# Patient Record
Sex: MALE | Race: WHITE | NOT HISPANIC OR LATINO | URBAN - METROPOLITAN AREA
[De-identification: names, ages, dates, MRNs, and addresses within clinical notes are randomized per-mention and may not be internally consistent; named-entity substitution may affect disease eponyms.]

---

## 2021-05-10 ENCOUNTER — OFFICE VISIT (OUTPATIENT)
Dept: URBAN - METROPOLITAN AREA CLINIC 68 | Facility: CLINIC | Age: 61
End: 2021-05-10

## 2021-05-18 ENCOUNTER — OFFICE VISIT (OUTPATIENT)
Dept: URBAN - METROPOLITAN AREA CLINIC 68 | Facility: CLINIC | Age: 61
End: 2021-05-18

## 2021-05-27 ENCOUNTER — OFFICE VISIT (OUTPATIENT)
Dept: URBAN - METROPOLITAN AREA SURGERY CENTER 12 | Facility: SURGERY CENTER | Age: 61
End: 2021-05-27

## 2022-06-04 ENCOUNTER — TELEPHONE ENCOUNTER (OUTPATIENT)
Dept: URBAN - METROPOLITAN AREA CLINIC 68 | Facility: CLINIC | Age: 62
End: 2022-06-04

## 2022-06-04 RX ORDER — GAUZE BANDAGE 4" X 4"
FISH OIL CONCENTRATE( 1000MG ORAL  DAILY ) INACTIVE -HX ENTRY BANDAGE TOPICAL DAILY
OUTPATIENT
Start: 2021-05-10

## 2022-06-04 RX ORDER — POLYETHYLENE GLYCOL 3350, SODIUM SULFATE, SODIUM CHLORIDE, POTASSIUM CHLORIDE, ASCORBIC ACID, SODIUM ASCORBATE 7.5-2.691G
KIT ORAL AS DIRECTED
Qty: 1 | Refills: 0 | OUTPATIENT
Start: 2011-12-19 | End: 2021-05-10

## 2022-06-04 RX ORDER — SODIUM SULFATE, POTASSIUM SULFATE, MAGNESIUM SULFATE 17.5; 3.13; 1.6 G/ML; G/ML; G/ML
SOLUTION, CONCENTRATE ORAL AS DIRECTED
Qty: 1 | Refills: 0 | OUTPATIENT
Start: 2021-05-10 | End: 2021-05-11

## 2022-06-05 ENCOUNTER — TELEPHONE ENCOUNTER (OUTPATIENT)
Dept: URBAN - METROPOLITAN AREA CLINIC 68 | Facility: CLINIC | Age: 62
End: 2022-06-05

## 2022-06-05 RX ORDER — ACYCLOVIR 400 MG/1
ACYCLOVIR( 400MG ORAL  DAILY ) ACTIVE -HX ENTRY TABLET ORAL DAILY
Status: ACTIVE | COMMUNITY
Start: 2021-05-10

## 2022-06-25 ENCOUNTER — TELEPHONE ENCOUNTER (OUTPATIENT)
Age: 62
End: 2022-06-25

## 2022-06-25 RX ORDER — GAUZE BANDAGE 4" X 4"
FISH OIL CONCENTRATE( 1000MG ORAL  DAILY ) INACTIVE -HX ENTRY BANDAGE TOPICAL DAILY
OUTPATIENT
Start: 2021-05-10

## 2022-06-25 RX ORDER — POLYETHYLENE GLYCOL 3350, SODIUM SULFATE, SODIUM CHLORIDE, POTASSIUM CHLORIDE, ASCORBIC ACID, SODIUM ASCORBATE 7.5-2.691G
KIT ORAL AS DIRECTED
Qty: 1 | Refills: 0 | OUTPATIENT
Start: 2011-12-19 | End: 2021-05-10

## 2022-06-25 RX ORDER — SODIUM SULFATE, POTASSIUM SULFATE, MAGNESIUM SULFATE 17.5; 3.13; 1.6 G/ML; G/ML; G/ML
SOLUTION, CONCENTRATE ORAL AS DIRECTED
Qty: 1 | Refills: 0 | OUTPATIENT
Start: 2021-05-10 | End: 2021-05-11

## 2022-06-26 ENCOUNTER — TELEPHONE ENCOUNTER (OUTPATIENT)
Age: 62
End: 2022-06-26

## 2022-06-26 RX ORDER — ACYCLOVIR 400 MG/1
ACYCLOVIR( 400MG ORAL  DAILY ) ACTIVE -HX ENTRY TABLET ORAL DAILY
Status: ACTIVE | COMMUNITY
Start: 2021-05-10

## 2023-11-07 ENCOUNTER — OFFICE VISIT (OUTPATIENT)
Dept: URBAN - METROPOLITAN AREA CLINIC 68 | Facility: CLINIC | Age: 63
End: 2023-11-07
Payer: COMMERCIAL

## 2023-11-07 VITALS
HEART RATE: 78 BPM | BODY MASS INDEX: 24 KG/M2 | WEIGHT: 193 LBS | TEMPERATURE: 97.8 F | RESPIRATION RATE: 16 BRPM | DIASTOLIC BLOOD PRESSURE: 80 MMHG | HEIGHT: 75 IN | OXYGEN SATURATION: 98 % | SYSTOLIC BLOOD PRESSURE: 122 MMHG

## 2023-11-07 DIAGNOSIS — R19.7 ACUTE DIARRHEA: ICD-10-CM

## 2023-11-07 PROCEDURE — 99204 OFFICE O/P NEW MOD 45 MIN: CPT | Performed by: SPECIALIST

## 2023-11-07 RX ORDER — ACYCLOVIR 400 MG/1
ACYCLOVIR( 400MG ORAL  DAILY ) ACTIVE -HX ENTRY TABLET ORAL DAILY
Status: ACTIVE | COMMUNITY
Start: 2021-05-10

## 2023-11-07 RX ORDER — LACTASE 9000 UNIT
1 TABLET WITH FIRST BITE OF DAIRY - CONTAINING FOOD TABLET ORAL ONCE A DAY
Qty: 30 | OUTPATIENT
Start: 2023-11-07 | End: 2023-12-07

## 2023-11-07 RX ORDER — METRONIDAZOLE 500 MG/1
1 TABLET TABLET ORAL THREE TIMES A DAY
Qty: 15 TABLET | Refills: 1 | OUTPATIENT
Start: 2023-11-07 | End: 2023-11-17

## 2023-11-07 NOTE — HPI-TODAY'S VISIT:
The patient developed new onset symptoms of loose stool bloating flatus and weight loss lasting over 2 weeks [place] of colitisLast colonoscopy in 21 was unremarkable IMPRESSIONLikely infectious etiology rule out Giardia rule out postinfectious irritable bowel syndrome and/or resultant lactose intolerance or bile malabsorption PLANStool testing for Giardia and infectionAvoid lactose foods and use Lactaid as neededEmpiric Flagyl 500 3 times daily for 5 days if no betterConsider colonoscopy if not improved in 2 weeks

## 2023-11-09 ENCOUNTER — TELEPHONE ENCOUNTER (OUTPATIENT)
Dept: URBAN - METROPOLITAN AREA CLINIC 68 | Facility: CLINIC | Age: 63
End: 2023-11-09

## 2023-11-09 RX ORDER — METRONIDAZOLE 500 MG/1
1 TABLET TABLET ORAL THREE TIMES A DAY
Qty: 15 TABLET | Refills: 1
Start: 2023-11-07 | End: 2023-11-19

## 2023-11-20 ENCOUNTER — OFFICE VISIT (OUTPATIENT)
Dept: URBAN - METROPOLITAN AREA CLINIC 68 | Facility: CLINIC | Age: 63
End: 2023-11-20

## 2023-11-27 ENCOUNTER — OFFICE VISIT (OUTPATIENT)
Dept: URBAN - METROPOLITAN AREA CLINIC 68 | Facility: CLINIC | Age: 63
End: 2023-11-27
Payer: COMMERCIAL

## 2023-11-27 VITALS
RESPIRATION RATE: 15 BRPM | WEIGHT: 183 LBS | SYSTOLIC BLOOD PRESSURE: 120 MMHG | DIASTOLIC BLOOD PRESSURE: 82 MMHG | TEMPERATURE: 98.7 F | BODY MASS INDEX: 22.75 KG/M2 | HEIGHT: 75 IN | HEART RATE: 88 BPM | OXYGEN SATURATION: 98 %

## 2023-11-27 DIAGNOSIS — A07.1 GIARDIASIS [LAMBLIASIS]: ICD-10-CM

## 2023-11-27 PROCEDURE — 99214 OFFICE O/P EST MOD 30 MIN: CPT | Performed by: SPECIALIST

## 2023-11-27 RX ORDER — TINIDAZOLE 500 MG/1
4 TABLETS WITH FOOD TABLET ORAL ONCE A DAY
Qty: 8 | OUTPATIENT
Start: 2023-11-27 | End: 2023-11-29

## 2023-11-27 RX ORDER — LACTASE 9000 UNIT
1 TABLET WITH FIRST BITE OF DAIRY - CONTAINING FOOD TABLET ORAL ONCE A DAY
Qty: 30 | Status: ACTIVE | COMMUNITY
Start: 2023-11-07 | End: 2023-12-07

## 2023-11-27 RX ORDER — ACYCLOVIR 400 MG/1
ACYCLOVIR( 400MG ORAL  DAILY ) ACTIVE -HX ENTRY TABLET ORAL DAILY
Status: ACTIVE | COMMUNITY
Start: 2021-05-10

## 2023-11-27 NOTE — HPI-TODAY'S VISIT:
11/27 Much iimproved, diarrhea resolved , after emperic flagy was successful POS stool for Giardia did not gain weight back   IMPRESSION GIARDIA , obtained in [facility] Improved but still didnt gain weight , one or 2 episodes loose stool Maybe recurred  PLAN   Tinidazole 2gm x 2 doses  FU as needed    PRIOR  The patient developed new onset symptoms of loose stool bloating flatus and weight loss lasting over 2 weeks [place] of colitisLast colonoscopy in 21 was unremarkable IMPRESSIONLikely infectious etiology rule out Giardia rule out postinfectious irritable bowel syndrome and/or resultant lactose intolerance or bile malabsorption PLANStool testing for Giardia and infectionAvoid lactose foods and use Lactaid as neededEmpiric Flagyl 500 3 times daily for 5 days if no betterConsider colonoscopy if not improved in 2 weeks

## 2024-02-06 ENCOUNTER — OV EP (OUTPATIENT)
Dept: URBAN - METROPOLITAN AREA CLINIC 68 | Facility: CLINIC | Age: 64
End: 2024-02-06
Payer: COMMERCIAL

## 2024-02-06 VITALS
BODY MASS INDEX: 23 KG/M2 | WEIGHT: 185 LBS | HEIGHT: 75 IN | DIASTOLIC BLOOD PRESSURE: 82 MMHG | SYSTOLIC BLOOD PRESSURE: 136 MMHG

## 2024-02-06 DIAGNOSIS — R19.7 ACUTE DIARRHEA: ICD-10-CM

## 2024-02-06 PROCEDURE — 99214 OFFICE O/P EST MOD 30 MIN: CPT | Performed by: SPECIALIST

## 2024-02-06 RX ORDER — ALBENDAZOLE 200 MG/1
2 TABLETS TABLET, FILM COATED ORAL ONCE A DAY
Qty: 10 TABLET | Refills: 0 | OUTPATIENT
Start: 2024-02-21 | End: 2024-02-26

## 2024-02-06 RX ORDER — ACYCLOVIR 400 MG/1
ACYCLOVIR( 400MG ORAL  DAILY ) ACTIVE -HX ENTRY TABLET ORAL DAILY
Status: ACTIVE | COMMUNITY
Start: 2021-05-10

## 2024-02-21 ENCOUNTER — OV EP (OUTPATIENT)
Dept: URBAN - METROPOLITAN AREA CLINIC 68 | Facility: CLINIC | Age: 64
End: 2024-02-21

## 2024-02-21 VITALS
SYSTOLIC BLOOD PRESSURE: 118 MMHG | HEIGHT: 75 IN | BODY MASS INDEX: 22.88 KG/M2 | DIASTOLIC BLOOD PRESSURE: 80 MMHG | WEIGHT: 184 LBS

## 2024-02-21 RX ORDER — TADALAFIL 5 MG/1
1 TABLET AS NEEDED TABLET, FILM COATED ORAL ONCE A DAY
Qty: 30 | Status: ACTIVE | COMMUNITY
Start: 2024-02-21 | End: 2024-03-22

## 2024-02-21 RX ORDER — METRONIDAZOLE 500 MG/1
TABLET ORAL
Qty: 15 TABLET | Refills: 0 | Status: ON HOLD | COMMUNITY

## 2024-02-21 RX ORDER — ACYCLOVIR 400 MG/1
TABLET ORAL
Qty: 60 TABLET | Refills: 0 | Status: ACTIVE | COMMUNITY

## 2024-02-21 RX ORDER — CEPHALEXIN 500 MG/1
CAPSULE ORAL
Qty: 15 APPLICATOR | Refills: 0 | Status: ON HOLD | COMMUNITY

## 2024-02-21 RX ORDER — HYDROCODONE BITARTRATE AND ACETAMINOPHEN 5; 325 MG/1; MG/1
TABLET ORAL
Qty: 12 TABLET | Refills: 0 | Status: ON HOLD | COMMUNITY

## 2024-02-21 RX ORDER — ACYCLOVIR 400 MG/1
ACYCLOVIR( 400MG ORAL  DAILY ) ACTIVE -HX ENTRY TABLET ORAL DAILY
Status: ACTIVE | COMMUNITY
Start: 2021-05-10

## 2024-02-21 NOTE — HPI-TODAY'S VISIT:
Recurrent symptoms of  bloating and no pain or fever  Possible source [place] not sure, also air travel  Cant gain weight , and now needs quinacrine  100 tid for 5 days

## 2024-02-27 ENCOUNTER — OV EP (OUTPATIENT)
Dept: URBAN - METROPOLITAN AREA CLINIC 68 | Facility: CLINIC | Age: 64
End: 2024-02-27

## 2024-04-16 ENCOUNTER — LAB (OUTPATIENT)
Dept: URBAN - METROPOLITAN AREA CLINIC 68 | Facility: CLINIC | Age: 64
End: 2024-04-16

## 2024-04-16 ENCOUNTER — OV EP (OUTPATIENT)
Dept: URBAN - METROPOLITAN AREA CLINIC 68 | Facility: CLINIC | Age: 64
End: 2024-04-16

## 2024-04-16 VITALS
BODY MASS INDEX: 24.07 KG/M2 | SYSTOLIC BLOOD PRESSURE: 136 MMHG | WEIGHT: 193.6 LBS | OXYGEN SATURATION: 97 % | HEART RATE: 72 BPM | HEIGHT: 75 IN | DIASTOLIC BLOOD PRESSURE: 82 MMHG

## 2024-04-16 RX ORDER — ACYCLOVIR 400 MG/1
TABLET ORAL
Qty: 60 TABLET | Refills: 0 | COMMUNITY

## 2024-04-16 RX ORDER — ACYCLOVIR 400 MG/1
ACYCLOVIR( 400MG ORAL  DAILY ) ACTIVE -HX ENTRY TABLET ORAL DAILY
COMMUNITY
Start: 2021-05-10

## 2024-04-16 RX ORDER — CEPHALEXIN 500 MG/1
CAPSULE ORAL
Qty: 15 APPLICATOR | Refills: 0 | COMMUNITY

## 2024-04-16 RX ORDER — HYDROCODONE BITARTRATE AND ACETAMINOPHEN 5; 325 MG/1; MG/1
TABLET ORAL
Qty: 12 TABLET | Refills: 0 | COMMUNITY

## 2024-04-16 RX ORDER — METRONIDAZOLE 500 MG/1
TABLET ORAL
Qty: 15 TABLET | Refills: 0 | COMMUNITY

## 2024-04-16 NOTE — HPI-TODAY'S VISIT:
4/16   RECURRENT symptoms for weeks  gas flatus worse after a large meal Bms were black , not on Dr Perry therap  Finished Rx quinalone 2/21  at home estranged wife threatened EXLAX poisoning  Need to exclude UGI patholgy or colitis or recurrent giardia     Recurrent symptoms of  bloating and no pain or fever  Possible source [facility] not sure, also air travel  Cant gain weight , and now needs quinacrine  100 tid for 5 days 2/21    stool POS recurrent Giardia  quinacrine is not available  took tinidazole now will RX albendazole   2/6/24  getting post prandial diarrhea erratically some days ok  never gained weight  formerly more wine and alot of lactose  episodes of once BM daily for 5 days, gone again can tolerate lg glass of milk\  recent skin surgery on antibiotic also amoxicillin for air travel due to wisdom tooth problem  Needs recheck stool for Giardia    IMPRESSION  post infection  11/27 Much improved, diarrhea resolved, after empiric Flagyl was successful POS stool for Giardia did not gain weight back   IMPRESSION GIARDIA, obtained in [facility] Improved but still didn't gain weight, one or 2 episodes loose stool Maybe recurred  PLAN   Tinidazole 2gm x 2 doses  FU as needed    PRIOR  The patient developed new onset symptoms of loose stool bloating flatus and weight loss lasting over 2 weeks began after a traveling trip to [place] No prior history of colitisLast colonoscopy in 21 was unremarkable IMPRESSIONLikely infectious etiology rule out Giardia rule out postinfectious irritable bowel syndrome and/or resultant lactose intolerance or bile malabsorption PLANS tool testing for Giardia and infectionAvoid lactose foods and use Lactaid as neededEmpiric Flagyl 500 3 times daily for 5 days if no betterConsider colonoscopy if not improved in 2 weeks

## 2024-04-26 ENCOUNTER — TELEP (OUTPATIENT)
Dept: URBAN - METROPOLITAN AREA TELEHEALTH 1 | Facility: TELEHEALTH | Age: 64
End: 2024-04-26

## 2024-05-06 ENCOUNTER — TELEPHONE ENCOUNTER (OUTPATIENT)
Dept: URBAN - METROPOLITAN AREA CLINIC 68 | Facility: CLINIC | Age: 64
End: 2024-05-06

## 2024-05-08 ENCOUNTER — TELEPHONE ENCOUNTER (OUTPATIENT)
Dept: URBAN - METROPOLITAN AREA CLINIC 68 | Facility: CLINIC | Age: 64
End: 2024-05-08

## 2024-05-09 ENCOUNTER — TELEPHONE ENCOUNTER (OUTPATIENT)
Dept: URBAN - METROPOLITAN AREA CLINIC 68 | Facility: CLINIC | Age: 64
End: 2024-05-09

## 2024-05-21 ENCOUNTER — OFFICE VISIT (OUTPATIENT)
Dept: URBAN - METROPOLITAN AREA SURGERY CENTER 12 | Facility: SURGERY CENTER | Age: 64
End: 2024-05-21

## 2024-05-22 ENCOUNTER — LAB OUTSIDE AN ENCOUNTER (OUTPATIENT)
Dept: URBAN - METROPOLITAN AREA CLINIC 68 | Facility: CLINIC | Age: 64
End: 2024-05-22

## 2024-05-23 LAB
IMMUNOGLOBULIN A: 182
IMMUNOGLOBULIN G: 975
IMMUNOGLOBULIN M: 124

## 2024-05-29 ENCOUNTER — DASHBOARD ENCOUNTERS (OUTPATIENT)
Age: 64
End: 2024-05-29

## 2024-05-29 ENCOUNTER — OFFICE VISIT (OUTPATIENT)
Dept: URBAN - METROPOLITAN AREA CLINIC 68 | Facility: CLINIC | Age: 64
End: 2024-05-29

## 2024-05-29 VITALS
BODY MASS INDEX: 23.62 KG/M2 | HEART RATE: 84 BPM | WEIGHT: 190 LBS | OXYGEN SATURATION: 96 % | HEIGHT: 75 IN | DIASTOLIC BLOOD PRESSURE: 80 MMHG | SYSTOLIC BLOOD PRESSURE: 120 MMHG

## 2024-05-29 RX ORDER — NITAZOXANIDE 500 MG/1
1 TABLET WITH FOOD TABLET, FILM COATED ORAL
Qty: 10 TABLET | Refills: 0 | Status: ON HOLD | COMMUNITY

## 2024-05-29 RX ORDER — ACYCLOVIR 400 MG/1
TABLET ORAL
Qty: 60 TABLET | Refills: 0 | COMMUNITY

## 2024-05-29 RX ORDER — ACYCLOVIR 400 MG/1
ACYCLOVIR( 400MG ORAL  DAILY ) ACTIVE -HX ENTRY TABLET ORAL DAILY
COMMUNITY
Start: 2021-05-10

## 2024-05-29 RX ORDER — HYDROCODONE BITARTRATE AND ACETAMINOPHEN 5; 325 MG/1; MG/1
TABLET ORAL
Qty: 12 TABLET | Refills: 0 | COMMUNITY

## 2024-05-29 RX ORDER — CEPHALEXIN 500 MG/1
CAPSULE ORAL
Qty: 15 APPLICATOR | Refills: 0 | COMMUNITY

## 2024-05-29 RX ORDER — METRONIDAZOLE 250 MG/1
3 TABLETS TABLET ORAL THREE TIMES A DAY
Qty: 135 TABLET | Refills: 1 | Status: ON HOLD | COMMUNITY

## 2024-05-29 RX ORDER — METRONIDAZOLE 500 MG/1
TABLET ORAL
Qty: 15 TABLET | Refills: 0 | COMMUNITY

## 2024-06-28 ENCOUNTER — OFFICE VISIT (OUTPATIENT)
Dept: URBAN - METROPOLITAN AREA CLINIC 68 | Facility: CLINIC | Age: 64
End: 2024-06-28

## 2024-07-22 ENCOUNTER — OFFICE VISIT (OUTPATIENT)
Dept: URBAN - METROPOLITAN AREA CLINIC 68 | Facility: CLINIC | Age: 64
End: 2024-07-22